# Patient Record
Sex: MALE | Race: WHITE | URBAN - METROPOLITAN AREA
[De-identification: names, ages, dates, MRNs, and addresses within clinical notes are randomized per-mention and may not be internally consistent; named-entity substitution may affect disease eponyms.]

---

## 2017-03-31 RX ORDER — METOPROLOL SUCCINATE 25 MG/1
TABLET, EXTENDED RELEASE ORAL
Qty: 30 TABLET | Refills: 5 | Status: SHIPPED | OUTPATIENT
Start: 2017-03-31 | End: 2017-11-21 | Stop reason: SDUPTHER

## 2017-07-11 RX ORDER — LISINOPRIL 5 MG/1
TABLET ORAL
Qty: 30 TABLET | Refills: 1 | Status: SHIPPED | OUTPATIENT
Start: 2017-07-11 | End: 2017-11-21 | Stop reason: SDUPTHER

## 2017-08-07 RX ORDER — ATORVASTATIN CALCIUM 40 MG/1
TABLET, FILM COATED ORAL
Qty: 30 TABLET | Refills: 0 | Status: SHIPPED | OUTPATIENT
Start: 2017-08-07 | End: 2017-09-06 | Stop reason: SDUPTHER

## 2017-09-06 DIAGNOSIS — E78.00 PURE HYPERCHOLESTEROLEMIA: Primary | ICD-10-CM

## 2017-09-06 DIAGNOSIS — I25.10 CORONARY ARTERY DISEASE DUE TO LIPID RICH PLAQUE: ICD-10-CM

## 2017-09-06 DIAGNOSIS — I25.83 CORONARY ARTERY DISEASE DUE TO LIPID RICH PLAQUE: ICD-10-CM

## 2017-09-06 RX ORDER — ATORVASTATIN CALCIUM 40 MG/1
TABLET, FILM COATED ORAL
Qty: 30 TABLET | Refills: 3 | Status: SHIPPED | OUTPATIENT
Start: 2017-09-06 | End: 2017-11-21 | Stop reason: SDUPTHER

## 2017-10-16 RX ORDER — LISINOPRIL 5 MG/1
TABLET ORAL
Qty: 30 TABLET | Refills: 6 | Status: SHIPPED | OUTPATIENT
Start: 2017-10-16 | End: 2017-11-21 | Stop reason: SDUPTHER

## 2017-11-21 ENCOUNTER — OFFICE VISIT (OUTPATIENT)
Dept: CARDIOLOGY CLINIC | Age: 72
End: 2017-11-21

## 2017-11-21 VITALS
WEIGHT: 240.13 LBS | OXYGEN SATURATION: 98 % | DIASTOLIC BLOOD PRESSURE: 82 MMHG | SYSTOLIC BLOOD PRESSURE: 134 MMHG | HEART RATE: 77 BPM | BODY MASS INDEX: 32.52 KG/M2 | HEIGHT: 72 IN

## 2017-11-21 DIAGNOSIS — I25.83 CORONARY ARTERY DISEASE DUE TO LIPID RICH PLAQUE: Primary | ICD-10-CM

## 2017-11-21 DIAGNOSIS — E78.2 MIXED HYPERLIPIDEMIA: ICD-10-CM

## 2017-11-21 DIAGNOSIS — I10 ESSENTIAL HYPERTENSION: ICD-10-CM

## 2017-11-21 DIAGNOSIS — I25.10 CORONARY ARTERY DISEASE DUE TO LIPID RICH PLAQUE: Primary | ICD-10-CM

## 2017-11-21 PROCEDURE — 99214 OFFICE O/P EST MOD 30 MIN: CPT | Performed by: INTERNAL MEDICINE

## 2017-11-21 PROCEDURE — 1036F TOBACCO NON-USER: CPT | Performed by: INTERNAL MEDICINE

## 2017-11-21 PROCEDURE — 3017F COLORECTAL CA SCREEN DOC REV: CPT | Performed by: INTERNAL MEDICINE

## 2017-11-21 PROCEDURE — 4040F PNEUMOC VAC/ADMIN/RCVD: CPT | Performed by: INTERNAL MEDICINE

## 2017-11-21 PROCEDURE — G8427 DOCREV CUR MEDS BY ELIG CLIN: HCPCS | Performed by: INTERNAL MEDICINE

## 2017-11-21 PROCEDURE — 1123F ACP DISCUSS/DSCN MKR DOCD: CPT | Performed by: INTERNAL MEDICINE

## 2017-11-21 PROCEDURE — G8599 NO ASA/ANTIPLAT THER USE RNG: HCPCS | Performed by: INTERNAL MEDICINE

## 2017-11-21 PROCEDURE — G8484 FLU IMMUNIZE NO ADMIN: HCPCS | Performed by: INTERNAL MEDICINE

## 2017-11-21 PROCEDURE — 93000 ELECTROCARDIOGRAM COMPLETE: CPT | Performed by: INTERNAL MEDICINE

## 2017-11-21 PROCEDURE — G8417 CALC BMI ABV UP PARAM F/U: HCPCS | Performed by: INTERNAL MEDICINE

## 2017-11-21 RX ORDER — LISINOPRIL 5 MG/1
5 TABLET ORAL DAILY
Qty: 90 TABLET | Refills: 3 | Status: SHIPPED | OUTPATIENT
Start: 2017-11-21 | End: 2019-02-14 | Stop reason: SDUPTHER

## 2017-11-21 RX ORDER — ATORVASTATIN CALCIUM 40 MG/1
40 TABLET, FILM COATED ORAL DAILY
Qty: 90 TABLET | Refills: 3 | Status: SHIPPED | OUTPATIENT
Start: 2017-11-21 | End: 2018-12-23 | Stop reason: SDUPTHER

## 2017-11-21 RX ORDER — METOPROLOL SUCCINATE 25 MG/1
25 TABLET, EXTENDED RELEASE ORAL DAILY
Qty: 90 TABLET | Refills: 3 | Status: SHIPPED | OUTPATIENT
Start: 2017-11-21 | End: 2018-12-23 | Stop reason: SDUPTHER

## 2017-11-21 NOTE — PROGRESS NOTES
12/28/14 224 lb (101.6 kg)     Constitutional: He is oriented to person, place, and time. He appears well-developed and well-nourished. In no acute distress. Head: Normocephalic and atraumatic. Pupils equal and round. Neck: Neck supple. No JVP or carotid bruit appreciated. No mass and no thyromegaly present. No lymphadenopathy present. Cardiovascular: Normal rate. Normal heart sounds. Exam reveals no gallop and no friction rub. No murmur heard. Pulmonary/Chest: Effort normal and breath sounds normal. No respiratory distress. He has no wheezes, rhonchi or rales. Abdominal: Soft, non-tender. Bowel sounds are normal. He exhibits no organomegaly, mass or bruit. Extremities: No edema, cyanosis, or clubbing. Pulses are 2+ radial/dorsalis pedis/posterior tibial/carotid bilaterally. Neurological: No gross cranial nerve deficit. Coordination normal.   Skin: Skin is warm and dry. There is no rash or diaphoresis. Psychiatric: He has a normal mood and affect. His speech is normal and behavior is normal.     Lab Review:   Lab Results   Component Value Date    TRIG 187 05/29/2014    HDL 44 05/29/2014    LDLCALC 141 05/29/2014    LABVLDL 37 05/29/2014       Lab Results   Component Value Date    BUN 22 12/28/2014    CREATININE 0.9 12/28/2014     EKG Interpretation: 11/2017, sinus rhythm     Image Review:     Cardiac cath:5/29/14  LEFT VENTRICULOGRAM: Reveals mild anterolateral hypokinesis but overall the ejection fraction is still around 50%. No mitral regurgitation is seen. OVERALL IMPRESSION:  1. Proximal 20% to 30% right coronary artery stenosis. 2. Patent left main trunk. 3. Stenosis 99.9% of the proximal left anterior descending artery with GIACOMO grade 1 flow. 4. Patent left circumflex artery.   5. Successful angioplasty followed by Drug-Eluting stent deployment in the proximal left anterior descending artery 99.9% stenosis reduced to 0%; final diameter is 3.50 in the proximal and 3.28 in the distal segment with GIACOMO grade 3 flow. 6. Bring the patient to ICU, get serial enzymes and get an echocardiogram.  7. Will continue with anti-platelet beta blocker and statin therapy. ECHO:5/29/14  Left ventricle size is normal.  Normal left ventricular wall thickness. Ejection fraction is visually estimated to be approximately 45%. There is severe hypokinesis of the apex. There is reversal of E/A inflow velocities across the mitral valve suggesting impaired left ventricular relaxation. Normal right ventricular size and function. There are no significant valvular abnormalities appreciated in this study. Assessment/Plan:     CAD (coronary artery disease)  Patient is here for a follow-up after a year and a half. Denies having any chest pain, shortness of breath, jaw pain or palpitation. His EKG today shows sinus rhythm with poor R-wave progression with no acute changes. He is currently on beta blocker, statin and aspirin therapy. I have told him to continue his current cardiac medications and have a regular follow-up    HTN (hypertension)  Blood pressure is stable today. I will repeat CMP. Hyperlipidemia  Patient had no recent lipid profile. Pt will have liver/ lipid profile rechecked. He will return in follow up in 6 months -1 year per patient request. Patient refuses to have a flu vaccine. Thank you very much for allowing me to participate in the care of your patient. Please do not hesitate to contact me if you have any questions.     Sincerely,  Em Corcoran MD      [address], [address] [place] [address] [address]  Ph: [phone]  Fax: [phone]

## 2017-11-21 NOTE — PATIENT INSTRUCTIONS
[employer] Fetch, and be sure to contact your doctor if:  · You would like help planning heart-healthy meals. Where can you learn more? Go to [email or web address] and sign in to your [email or web address] account. Enter V137 in the [employer] box to learn more about \"Heart-Healthy Diet: Care Instructions. \"     If you do not have an account, please click on the \"Sign Up Now\" link. Current as of: April 3, 2017  Content Version: 11.3  © 8471-0797 [employer]. Care instructions adapted under license by [facility] ([facility]). If you have questions about a medical condition or this instruction, always ask your healthcare professional. [address] any warranty or liability for your use of this information. Patient Education        High Blood Pressure: Care Instructions  Your Care Instructions  If your blood pressure is usually above 140/90, you have high blood pressure, or hypertension. That means the top number is 140 or higher or the bottom number is 90 or higher, or both. Despite what a lot of people think, high blood pressure usually doesn't cause headaches or make you feel dizzy or lightheaded. It usually has no symptoms. But it does increase your risk for heart attack, stroke, and kidney or eye damage. The higher your blood pressure, the more your risk increases. Your doctor will give you a goal for your blood pressure. Your goal will be based on your health and your age. An example of a goal is to keep your blood pressure below 140/90. Lifestyle changes, such as eating healthy and being active, are always important to help lower blood pressure. You might also take medicine to reach your blood pressure goal.  Follow-up care is a key part of your treatment and safety. Be sure to make and go to all appointments, and call your doctor if you are having problems. It's also a good idea to know your test results and keep a list of the medicines you take.   How can you care for yourself at home? Medical treatment  · If you stop taking your medicine, your blood pressure will go back up. You may take one or more types of medicine to lower your blood pressure. Be safe with medicines. Take your medicine exactly as prescribed. Call your doctor if you think you are having a problem with your medicine. · Talk to your doctor before you start taking aspirin every day. Aspirin can help certain people lower their risk of a heart attack or stroke. But taking aspirin isn't right for everyone, because it can cause serious bleeding. · See your doctor regularly. You may need to see the doctor more often at first or until your blood pressure comes down. · If you are taking blood pressure medicine, talk to your doctor before you take decongestants or anti-inflammatory medicine, such as ibuprofen. Some of these medicines can raise blood pressure. · Learn how to check your blood pressure at home. Lifestyle changes  · Stay at a healthy weight. This is especially important if you put on weight around the waist. Losing even 10 pounds can help you lower your blood pressure. · If your doctor recommends it, get more exercise. Walking is a good choice. Bit by bit, increase the amount you walk every day. Try for at least 30 minutes on most days of the week. You also may want to swim, bike, or do other activities. · Avoid or limit alcohol. Talk to your doctor about whether you can drink any alcohol. · Try to limit how much sodium you eat to less than 2,300 milligrams (mg) a day. Your doctor may ask you to try to eat less than 1,500 mg a day. · Eat plenty of fruits (such as bananas and oranges), vegetables, legumes, whole grains, and low-fat dairy products. · Lower the amount of saturated fat in your diet. Saturated fat is found in animal products such as milk, cheese, and meat. Limiting these foods may help you lose weight and also lower your risk for heart disease. · Do not smoke.  Smoking increases your risk for heart attack and stroke. If you need help quitting, talk to your doctor about stop-smoking programs and medicines. These can increase your chances of quitting for good. When should you call for help? Call 911 anytime you think you may need emergency care. This may mean having symptoms that suggest that your blood pressure is causing a serious heart or blood vessel problem. Your blood pressure may be over 180/110. For example, call 911 if:  · You have symptoms of a heart attack. These may include:  ¨ Chest pain or pressure, or a strange feeling in the chest.  ¨ Sweating. ¨ Shortness of breath. ¨ Nausea or vomiting. ¨ Pain, pressure, or a strange feeling in the back, neck, jaw, or upper belly or in one or both shoulders or arms. ¨ Lightheadedness or sudden weakness. ¨ A fast or irregular heartbeat. · You have symptoms of a stroke. These may include:  ¨ Sudden numbness, tingling, weakness, or loss of movement in your face, arm, or leg, especially on only one side of your body. ¨ Sudden vision changes. ¨ Sudden trouble speaking. ¨ Sudden confusion or trouble understanding simple statements. ¨ Sudden problems with walking or balance. ¨ A sudden, severe headache that is different from past headaches. · You have severe back or belly pain. Do not wait until your blood pressure comes down on its own. Get help right away. Call your doctor now or seek immediate care if:  · Your blood pressure is much higher than normal (such as 180/110 or higher), but you don't have symptoms. · You think high blood pressure is causing symptoms, such as:  ¨ Severe headache. ¨ Blurry vision. Watch closely for changes in your health, and be sure to contact your doctor if:  · Your blood pressure measures 140/90 or higher at least 2 times. That means the top number is 140 or higher or the bottom number is 90 or higher, or both. · You think you may be having side effects from your blood pressure medicine.   · Your blood pressure is usually normal, but it goes above normal at least 2 times. Where can you learn more? Go to [email or web address] and sign in to your [employer] account. Enter F563 in the [employer] box to learn more about \"High Blood Pressure: Care Instructions. \"     If you do not have an account, please click on the \"Sign Up Now\" link. Current as of: August 8, 2016  Content Version: 11.3  © 5574-8344 [employer]. Care instructions adapted under license by [facility] ([facility]). If you have questions about a medical condition or this instruction, always ask your healthcare professional. [address] any warranty or liability for your use of this information.

## 2018-12-24 RX ORDER — METOPROLOL SUCCINATE 25 MG/1
TABLET, EXTENDED RELEASE ORAL
Qty: 90 TABLET | Refills: 0 | Status: SHIPPED | OUTPATIENT
Start: 2018-12-24 | End: 2019-04-12 | Stop reason: SDUPTHER

## 2018-12-24 RX ORDER — ATORVASTATIN CALCIUM 40 MG/1
TABLET, FILM COATED ORAL
Qty: 90 TABLET | Refills: 0 | Status: SHIPPED | OUTPATIENT
Start: 2018-12-24 | End: 2019-04-12 | Stop reason: SDUPTHER

## 2019-02-16 RX ORDER — LISINOPRIL 5 MG/1
TABLET ORAL
Qty: 30 TABLET | Refills: 0 | Status: SHIPPED | OUTPATIENT
Start: 2019-02-16 | End: 2019-07-26 | Stop reason: SDUPTHER

## 2019-04-11 DIAGNOSIS — Z79.899 ON STATIN THERAPY: ICD-10-CM

## 2019-04-11 DIAGNOSIS — E78.2 MIXED HYPERLIPIDEMIA: ICD-10-CM

## 2019-04-11 DIAGNOSIS — I25.10 CORONARY ARTERY DISEASE INVOLVING NATIVE CORONARY ARTERY OF NATIVE HEART WITHOUT ANGINA PECTORIS: Primary | ICD-10-CM

## 2019-04-11 RX ORDER — ATORVASTATIN CALCIUM 40 MG/1
TABLET, FILM COATED ORAL
Qty: 90 TABLET | Refills: 2 | OUTPATIENT
Start: 2019-04-11

## 2019-04-11 NOTE — TELEPHONE ENCOUNTER
Last OV 11/21/17. Requested to return in 6 to 12 months. No show on 11/21/18. Lab work ordered on 11/21/17 and 4/27/17. No longer lab done after ordered. Attempted to call patient at home. Not allowed to talk to anyone per HIPAA. Son confirmed patient's cell phone number [phone]. Called patient and let him know that if he makes an appointment today that I can refill enough to hold him over to his appointment and he can get additional refills after keeping his appointment. I also requested that he have past due lab work. Patient declined both lab work and office visit. He said he will call back and schedule visit. I asked him to let the front office aware so we can refill his medications at that time. Patient verbalized and confirmed understanding. Place order in Epic for fastin Lipid panel and CMP.

## 2019-04-16 RX ORDER — METOPROLOL SUCCINATE 25 MG/1
TABLET, EXTENDED RELEASE ORAL
Qty: 30 TABLET | Refills: 0 | Status: SHIPPED | OUTPATIENT
Start: 2019-04-16 | End: 2019-07-26 | Stop reason: SDUPTHER

## 2019-04-16 RX ORDER — ATORVASTATIN CALCIUM 40 MG/1
TABLET, FILM COATED ORAL
Qty: 30 TABLET | Refills: 0 | Status: SHIPPED | OUTPATIENT
Start: 2019-04-16 | End: 2019-07-26 | Stop reason: SDUPTHER

## 2019-04-23 RX ORDER — METOPROLOL SUCCINATE 25 MG/1
TABLET, EXTENDED RELEASE ORAL
Qty: 90 TABLET | Refills: 0 | Status: SHIPPED | OUTPATIENT
Start: 2019-04-23 | End: 2019-10-02 | Stop reason: SDUPTHER

## 2019-07-26 RX ORDER — METOPROLOL SUCCINATE 25 MG/1
TABLET, EXTENDED RELEASE ORAL
Qty: 30 TABLET | Refills: 1 | Status: SHIPPED | OUTPATIENT
Start: 2019-07-26 | End: 2019-10-02 | Stop reason: SDUPTHER

## 2019-07-26 RX ORDER — LISINOPRIL 5 MG/1
TABLET ORAL
Qty: 30 TABLET | Refills: 1 | Status: SHIPPED | OUTPATIENT
Start: 2019-07-26 | End: 2019-10-02 | Stop reason: SDUPTHER

## 2019-07-26 RX ORDER — ATORVASTATIN CALCIUM 40 MG/1
TABLET, FILM COATED ORAL
Qty: 30 TABLET | Refills: 1 | Status: SHIPPED | OUTPATIENT
Start: 2019-07-26 | End: 2019-10-02 | Stop reason: SDUPTHER

## 2019-07-26 NOTE — TELEPHONE ENCOUNTER
Medication Refill    Last appointment with cardiology? 11/21/2017    Next appointment with Cardiology? 10/02/2019      Medication needing refilled: atorvastatin (LIPITOR)  Doseage of the medication: 40 MG tablet  How are you taking this medication (QD, BID, TID, QID, PRN):TAKE 1 TABLET BY MOUTH ONE TIME A DAY     Patient want a 30 or 90 day supply called in:  30 day     Medication needing refilled: metoprolol succinate (TOPROL XL)   Doseage of the medication: 25 MG extended release tablet   How are you taking this medication (QD, BID, TID, QID  TAKE 1 TABLET BY MOUTH ONE TIME A DAY    PRN):Patient want a 30 or 90 day supply called in:  30 day     Medication needing refilled: lisinopril (PRINIVIL;ZESTRIL)   Doseage of the medication: 5 MG tablet   How are you taking this medication (QD, BID, TID, QID, PRN):  TAKE ONE TABLET BY MOUTH DAILY  Patient want a 30 or 90 day supply called in:  30 days      Which Pharmacy are we sending the medication to:   [id number] - [facility], [address] [facility]                          Which Pharmacy are we sending the medication to

## 2019-10-02 ENCOUNTER — OFFICE VISIT (OUTPATIENT)
Dept: CARDIOLOGY CLINIC | Age: 74
End: 2019-10-02
Payer: MEDICARE

## 2019-10-02 VITALS
HEART RATE: 58 BPM | SYSTOLIC BLOOD PRESSURE: 122 MMHG | HEIGHT: 72 IN | BODY MASS INDEX: 33.18 KG/M2 | WEIGHT: 245 LBS | OXYGEN SATURATION: 98 % | DIASTOLIC BLOOD PRESSURE: 78 MMHG

## 2019-10-02 DIAGNOSIS — I10 ESSENTIAL HYPERTENSION: ICD-10-CM

## 2019-10-02 DIAGNOSIS — E78.2 MIXED HYPERLIPIDEMIA: ICD-10-CM

## 2019-10-02 DIAGNOSIS — I25.10 CAD IN NATIVE ARTERY: Primary | ICD-10-CM

## 2019-10-02 PROCEDURE — G8417 CALC BMI ABV UP PARAM F/U: HCPCS | Performed by: INTERNAL MEDICINE

## 2019-10-02 PROCEDURE — 4040F PNEUMOC VAC/ADMIN/RCVD: CPT | Performed by: INTERNAL MEDICINE

## 2019-10-02 PROCEDURE — 1036F TOBACCO NON-USER: CPT | Performed by: INTERNAL MEDICINE

## 2019-10-02 PROCEDURE — 93000 ELECTROCARDIOGRAM COMPLETE: CPT | Performed by: INTERNAL MEDICINE

## 2019-10-02 PROCEDURE — G8427 DOCREV CUR MEDS BY ELIG CLIN: HCPCS | Performed by: INTERNAL MEDICINE

## 2019-10-02 PROCEDURE — G8484 FLU IMMUNIZE NO ADMIN: HCPCS | Performed by: INTERNAL MEDICINE

## 2019-10-02 PROCEDURE — 99214 OFFICE O/P EST MOD 30 MIN: CPT | Performed by: INTERNAL MEDICINE

## 2019-10-02 PROCEDURE — G8599 NO ASA/ANTIPLAT THER USE RNG: HCPCS | Performed by: INTERNAL MEDICINE

## 2019-10-02 PROCEDURE — 1123F ACP DISCUSS/DSCN MKR DOCD: CPT | Performed by: INTERNAL MEDICINE

## 2019-10-02 PROCEDURE — 3017F COLORECTAL CA SCREEN DOC REV: CPT | Performed by: INTERNAL MEDICINE

## 2019-10-02 RX ORDER — LISINOPRIL 5 MG/1
TABLET ORAL
Qty: 90 TABLET | Refills: 0 | Status: SHIPPED | OUTPATIENT
Start: 2019-10-02 | End: 2020-04-18

## 2019-10-02 RX ORDER — ATORVASTATIN CALCIUM 40 MG/1
TABLET, FILM COATED ORAL
Qty: 90 TABLET | Refills: 0 | Status: SHIPPED | OUTPATIENT
Start: 2019-10-02 | End: 2020-03-03

## 2019-10-02 RX ORDER — METOPROLOL SUCCINATE 25 MG/1
TABLET, EXTENDED RELEASE ORAL
Qty: 90 TABLET | Refills: 0 | Status: SHIPPED | OUTPATIENT
Start: 2019-10-02 | End: 2020-03-03

## 2019-12-04 RX ORDER — LISINOPRIL 5 MG/1
TABLET ORAL
Qty: 30 TABLET | Refills: 5 | Status: SHIPPED | OUTPATIENT
Start: 2019-12-04 | End: 2021-08-11 | Stop reason: SDUPTHER

## 2020-03-03 RX ORDER — ATORVASTATIN CALCIUM 40 MG/1
TABLET, FILM COATED ORAL
Qty: 90 TABLET | Refills: 0 | Status: SHIPPED | OUTPATIENT
Start: 2020-03-03 | End: 2020-06-01

## 2020-03-03 RX ORDER — METOPROLOL SUCCINATE 25 MG/1
TABLET, EXTENDED RELEASE ORAL
Qty: 90 TABLET | Refills: 3 | Status: SHIPPED | OUTPATIENT
Start: 2020-03-03 | End: 2021-05-17 | Stop reason: SDUPTHER

## 2020-04-18 RX ORDER — LISINOPRIL 5 MG/1
TABLET ORAL
Qty: 90 TABLET | Refills: 0 | Status: SHIPPED | OUTPATIENT
Start: 2020-04-18 | End: 2020-10-23

## 2020-06-01 RX ORDER — ATORVASTATIN CALCIUM 40 MG/1
TABLET, FILM COATED ORAL
Qty: 30 TABLET | Refills: 0 | Status: SHIPPED | OUTPATIENT
Start: 2020-06-01 | End: 2020-06-03

## 2020-06-01 NOTE — TELEPHONE ENCOUNTER
Last OV: 10/2/19  Next OV: 10/7/2020  Labs: x  EKG:    Patient needs liver panel. LMOM to return call. Will send in 30 day supply and place order.

## 2020-06-03 RX ORDER — ATORVASTATIN CALCIUM 40 MG/1
TABLET, FILM COATED ORAL
Qty: 30 TABLET | Refills: 0 | Status: SHIPPED | OUTPATIENT
Start: 2020-06-03 | End: 2020-11-12

## 2020-10-23 RX ORDER — LISINOPRIL 5 MG/1
TABLET ORAL
Qty: 90 TABLET | Refills: 0 | Status: SHIPPED | OUTPATIENT
Start: 2020-10-23 | End: 2021-02-26

## 2020-11-10 NOTE — TELEPHONE ENCOUNTER
Last OV 10/2/19. Next OV not scheduled. Requested to return in one year. Can not find Lipid panel in Epic. Attempted to call patient. Left message on voicemail to have fasting lab work as previously ordered by Dr. Fallon Deleon and also call our office to schedule an appointment.

## 2020-11-11 NOTE — TELEPHONE ENCOUNTER
Attempted to call patient again today. Left another message to please return call to office regarding patient having fasting lab work and scheduling an appointment.

## 2020-11-12 RX ORDER — ATORVASTATIN CALCIUM 40 MG/1
TABLET, FILM COATED ORAL
Qty: 60 TABLET | Refills: 0 | Status: SHIPPED | OUTPATIENT
Start: 2020-11-12 | End: 2021-05-17 | Stop reason: SDUPTHER

## 2020-11-12 NOTE — TELEPHONE ENCOUNTER
will call pt about scheduling appt and completing labs. Reminder note will print on the bottle as well as note to pharmacist to  pt.      LAST OV:  10/2/2020  NEXT OV:  none  LAST no labs since 2014

## 2021-02-26 RX ORDER — LISINOPRIL 5 MG/1
TABLET ORAL
Qty: 30 TABLET | Refills: 0 | Status: SHIPPED | OUTPATIENT
Start: 2021-02-26 | End: 2021-05-28

## 2021-05-11 RX ORDER — ATORVASTATIN CALCIUM 40 MG/1
TABLET, FILM COATED ORAL
Qty: 60 TABLET | Refills: 0 | OUTPATIENT
Start: 2021-05-11

## 2021-05-11 RX ORDER — LISINOPRIL 5 MG/1
TABLET ORAL
Qty: 30 TABLET | Refills: 0 | OUTPATIENT
Start: 2021-05-11

## 2021-05-11 NOTE — TELEPHONE ENCOUNTER
Last OV: 10/2/19  Next OV: x  Labs:  Last EKG (if needed):     Made On:  Canceled: 10/2/2019 1:46 PM  10/7/2020 2:03 PM By:  By: BRITTNEY Fitzpatrick   Cancel Rsn: Patient no show (L/V FOR PT. TO CALL TO RESCHEDULE.)

## 2021-05-17 RX ORDER — METOPROLOL SUCCINATE 25 MG/1
25 TABLET, EXTENDED RELEASE ORAL DAILY
Qty: 90 TABLET | Refills: 3 | Status: SHIPPED | OUTPATIENT
Start: 2021-05-17 | End: 2022-05-31

## 2021-05-17 RX ORDER — METOPROLOL SUCCINATE 25 MG/1
TABLET, EXTENDED RELEASE ORAL
Qty: 90 TABLET | Refills: 0 | OUTPATIENT
Start: 2021-05-17

## 2021-05-17 RX ORDER — ATORVASTATIN CALCIUM 40 MG/1
TABLET, FILM COATED ORAL
Qty: 60 TABLET | Refills: 0 | OUTPATIENT
Start: 2021-05-17

## 2021-05-17 RX ORDER — ATORVASTATIN CALCIUM 40 MG/1
TABLET, FILM COATED ORAL
Qty: 60 TABLET | Refills: 0 | Status: SHIPPED | OUTPATIENT
Start: 2021-05-17 | End: 2021-08-16

## 2021-05-28 RX ORDER — LISINOPRIL 5 MG/1
TABLET ORAL
Qty: 30 TABLET | Refills: 0 | Status: SHIPPED | OUTPATIENT
Start: 2021-05-28 | End: 2021-07-23

## 2021-07-23 RX ORDER — LISINOPRIL 5 MG/1
TABLET ORAL
Qty: 30 TABLET | Refills: 0 | Status: SHIPPED | OUTPATIENT
Start: 2021-07-23 | End: 2021-09-10

## 2021-08-11 ENCOUNTER — OFFICE VISIT (OUTPATIENT)
Dept: CARDIOLOGY CLINIC | Age: 76
End: 2021-08-11
Payer: MEDICARE

## 2021-08-11 VITALS
OXYGEN SATURATION: 95 % | WEIGHT: 255 LBS | HEIGHT: 72 IN | HEART RATE: 66 BPM | BODY MASS INDEX: 34.54 KG/M2 | SYSTOLIC BLOOD PRESSURE: 136 MMHG | DIASTOLIC BLOOD PRESSURE: 84 MMHG

## 2021-08-11 DIAGNOSIS — I25.10 CORONARY ARTERY DISEASE INVOLVING NATIVE CORONARY ARTERY OF NATIVE HEART WITHOUT ANGINA PECTORIS: Primary | ICD-10-CM

## 2021-08-11 DIAGNOSIS — R63.5 WEIGHT GAIN: ICD-10-CM

## 2021-08-11 DIAGNOSIS — I25.9 ISCHEMIC HEART DISEASE: ICD-10-CM

## 2021-08-11 DIAGNOSIS — R60.0 LEG EDEMA: ICD-10-CM

## 2021-08-11 DIAGNOSIS — I50.23 ACUTE ON CHRONIC SYSTOLIC HEART FAILURE (HCC): ICD-10-CM

## 2021-08-11 DIAGNOSIS — I10 ESSENTIAL HYPERTENSION: ICD-10-CM

## 2021-08-11 DIAGNOSIS — I51.89 DIASTOLIC DYSFUNCTION: ICD-10-CM

## 2021-08-11 DIAGNOSIS — E78.5 HYPERLIPIDEMIA, UNSPECIFIED HYPERLIPIDEMIA TYPE: ICD-10-CM

## 2021-08-11 PROCEDURE — 93000 ELECTROCARDIOGRAM COMPLETE: CPT | Performed by: INTERNAL MEDICINE

## 2021-08-11 PROCEDURE — 99214 OFFICE O/P EST MOD 30 MIN: CPT | Performed by: INTERNAL MEDICINE

## 2021-08-11 PROCEDURE — 1036F TOBACCO NON-USER: CPT | Performed by: INTERNAL MEDICINE

## 2021-08-11 PROCEDURE — G8417 CALC BMI ABV UP PARAM F/U: HCPCS | Performed by: INTERNAL MEDICINE

## 2021-08-11 PROCEDURE — 4040F PNEUMOC VAC/ADMIN/RCVD: CPT | Performed by: INTERNAL MEDICINE

## 2021-08-11 PROCEDURE — G8427 DOCREV CUR MEDS BY ELIG CLIN: HCPCS | Performed by: INTERNAL MEDICINE

## 2021-08-11 PROCEDURE — 1123F ACP DISCUSS/DSCN MKR DOCD: CPT | Performed by: INTERNAL MEDICINE

## 2021-08-11 RX ORDER — FUROSEMIDE 40 MG/1
40 TABLET ORAL DAILY
Qty: 30 TABLET | Refills: 6 | Status: SHIPPED | OUTPATIENT
Start: 2021-08-11 | End: 2022-10-11

## 2021-08-11 NOTE — PATIENT INSTRUCTIONS
Patient Education        Transthoracic Echocardiogram: About This Test  What is it? An echocardiogram (also called an echo) uses sound waves to make an image of your heart. A device called a transducer sends sound waves that echo off your heart and back to the transducer. These echoes are turned into moving pictures of your heart that can be seen on a video screen. In a transthoracic echocardiogram (TTE), the transducer is moved across your chest or belly. A TTE is the most common type of echocardiogram.  Why is this test done? This test is done to check your heart health. It's used for many reasons. For example, it may be done to:  · Check a heart murmur. · Look for the cause of shortness of breath or unexplained chest pain or pressure. · Check how well your heart is pumping blood. · Check to see how well your heart valves are working. · Look for blood clots inside your heart. · Measure the size and shape of the heart's chambers. · Measure the blood pressure and speed of blood flow through the heart. How is the test done? · You may be asked to remove your clothes above your waist. You may be given a cloth or paper covering to use during the test.  · You will lie on your back or on your left side on a bed or table. · You may receive medicine through a vein (intravenously, or IV). The IV can be used to give you a contrast material. This helps your doctor get good views of your heart. · Small pads or patches (electrodes) will be placed on the skin of your chest to record your heart rate during the test.  · A small amount of gel will be rubbed on the side of your chest to help  the sound waves. · The transducer will be pressed firmly against your chest and moved slowly back and forth. It is usually moved to different areas on your chest or belly to get specific views of your heart.   · You will be asked to do several things, such as hold very still, breathe in and out very slowly, hold your breath, or lie on your left side. · When the test is over, the gel is wiped off and the electrodes are removed. What are the risks of the test?  There are no known risks from having this test.  · No electricity passes through your body during the test. There is no danger of getting an electrical shock. · You do not receive any radiation. What happens after the test?  · You will probably be able to go home right away. It depends on the reason for the test.  · You can go back to your usual activities right away. Follow-up care is a key part of your treatment and safety. Be sure to make and go to all appointments, and call your doctor if you are having problems. It's also a good idea to keep a list of the medicines you take. Ask your doctor when you can expect to have your test results. Current as of: August 31, 2020               Content Version: 12.9  © 2006-2021 Healthwise, [employer]. Care instructions adapted under license by [facility] ([facility]). If you have questions about a medical condition or this instruction, always ask your healthcare professional. [place] [address] any warranty or liability for your use of this information. Patient Education        Low Sodium Diet (2,000 Milligram): Care Instructions  Overview     Limiting sodium can be an important part of managing some health problems. The most common source of sodium is salt. People get most of the salt in their diet from canned, prepared, and packaged foods. Fast food and restaurant meals also are very high in sodium. Your doctor will probably limit your sodium to less than 2,000 milligrams (mg) a day. This limit counts all the sodium in prepared and packaged foods and any salt you add to your food. Follow-up care is a key part of your treatment and safety. Be sure to make and go to all appointments, and call your doctor if you are having problems.  It's also a good idea to know your test results and keep a list of the medicines you take. How can you care for yourself at home? Read food labels  · Read labels on cans and food packages. The labels tell you how much sodium is in each serving. Make sure that you look at the serving size. If you eat more than the serving size, you have eaten more sodium. · Food labels also tell you the Percent Daily Value for sodium. Choose products with low Percent Daily Values for sodium. · Be aware that sodium can come in forms other than salt, including monosodium glutamate (MSG), sodium citrate, and sodium bicarbonate (baking soda). MSG is often added to Asian food. When you eat out, you can sometimes ask for food without MSG or added salt. Buy low-sodium foods  · Buy foods that are labeled \"unsalted\" (no salt added), \"sodium-free\" (less than 5 mg of sodium per serving), or \"low-sodium\" (140 mg or less of sodium per serving). Foods labeled \"reduced-sodium\" and \"light sodium\" may still have too much sodium. Be sure to read the label to see how much sodium you are getting. · Buy fresh vegetables, or frozen vegetables without added sauces. Buy low-sodium versions of canned vegetables, soups, and other canned goods. Prepare low-sodium meals  · Cut back on the amount of salt you use in cooking. This will help you adjust to the taste. Do not add salt after cooking. One teaspoon of salt has about 2,300 mg of sodium. · Take the salt shaker off the table. · Flavor your food with garlic, lemon juice, onion, vinegar, herbs, and spices. Do not use soy sauce, lite soy sauce, steak sauce, onion salt, garlic salt, celery salt, or ketchup on your food. · Use low-sodium salad dressings, sauces, and ketchup. Or make your own salad dressings and sauces without adding salt. · Use less salt (or none) when recipes call for it. You can often use half the salt a recipe calls for without losing flavor. Other foods such as rice, pasta, and grains do not need added salt.   · Rinse canned vegetables, and cook them in fresh water. This removes some--but not all--of the salt. · Avoid water that is naturally high in sodium or that has been treated with water softeners, which add sodium. If you buy bottled water, read the label and choose a sodium-free brand. Avoid high-sodium foods  · Avoid eating:  ? Smoked, cured, salted, and canned meat, fish, and poultry. ? Ham, anderson, hot dogs, and luncheon meats. ? Regular, hard, and processed cheese and regular peanut butter. ? Crackers with salted tops, and other salted snack foods such as pretzels, chips, and salted popcorn. ? Frozen prepared meals, unless labeled low-sodium. ? Canned and dried soups, broths, and bouillon, unless labeled sodium-free or low-sodium. ? Canned vegetables, unless labeled sodium-free or low-sodium. ? [place] fries, pizza, tacos, and other fast foods. ? Pickles, olives, ketchup, and other condiments, especially soy sauce, unless labeled sodium-free or low-sodium. Where can you learn more? Go to https://ThreatStream.Stretch. org and sign in to your Mobile Authentication account. Enter C462 in the [facility] box to learn more about \"Low Sodium Diet (2,000 Milligram): Care Instructions. \"     If you do not have an account, please click on the \"Sign Up Now\" link. Current as of: December 17, 2020               Content Version: 12.9  © 2006-2021 [employer]. Care instructions adapted under license by [facility] ([facility]). If you have questions about a medical condition or this instruction, always ask your healthcare professional. [place] [address] any warranty or liability for your use of this information. Patient Education        Avoiding Triggers With Heart Failure: Care Instructions  Your Care Instructions     Triggers are anything that make your heart failure flare up. A flare-up is also called \"sudden heart failure\" or \"acute heart failure. \" When you have a flare-up, fluid builds up in your lungs, and you have food without MSG or salt. · Slowly reducing salt will help you adjust to the taste. Take the salt shaker off the table. · Flavor your food with garlic, lemon juice, onion, vinegar, herbs, and spices instead of salt. Do not use soy sauce, steak sauce, onion salt, garlic salt, mustard, or ketchup on your food, unless it is labeled \"low-sodium\" or \"low-salt. \"  · Make your own salad dressings, sauces, and ketchup without adding salt. · Use fresh or frozen ingredients, instead of canned ones, whenever you can. Choose low-sodium canned goods. · Eat less processed food and food from restaurants, including fast food. Exercise as directed  Moderate, regular exercise is very good for your heart. It improves your blood flow and helps control your weight. But too much exercise can stress your heart and cause a heart failure flare-up. · Check with your doctor before you start an exercise program.  · Walking is an easy way to get exercise. Start out slowly. Gradually increase the length and pace of your walk. Swimming, riding a bike, and using a treadmill are also good forms of exercise. · When you exercise, watch for signs that your heart is working too hard. You are pushing yourself too hard if you cannot talk while you are exercising. If you become short of breath or dizzy or have chest pain, stop, sit down, and rest.  · Do not exercise when you do not feel well. Take medicines correctly  · Take your medicines exactly as prescribed. Call your doctor if you think you are having a problem with your medicine. · Make a list of all the medicines you take. Include those prescribed to you by other doctors and any over-the-counter medicines, vitamins, or supplements you take. Take this list with you when you go to any doctor. · Take your medicines at the same time every day. It may help you to post a list of all the medicines you take every day and what time of day you take them.   · Make taking your medicine as simple as you can. Plan times to take your medicines when you are doing other things, such as eating a meal or getting ready for bed. This will make it easier to remember to take your medicines. · Get organized. Use helpful tools, such as daily or weekly pill containers. When should you call for help? Call 911  if you have symptoms of sudden heart failure such as:    · You have severe trouble breathing.     · You cough up pink, foamy mucus.     · You have a new irregular or rapid heartbeat. Call your doctor now or seek immediate medical care if:    · You have new or increased shortness of breath.     · You are dizzy or lightheaded, or you feel like you may faint.     · You have sudden weight gain, such as more than 2 to 3 pounds in a day or 5 pounds in a week. (Your doctor may suggest a different range of weight gain.)     · You have increased swelling in your legs, ankles, or feet.     · You are suddenly so tired or weak that you cannot do your usual activities. Watch closely for changes in your health, and be sure to contact your doctor if you develop new symptoms. Where can you learn more? Go to https://Earl Energy.Lighting Science [email or web address] org and sign in to your Porous Power account. Enter S972 in the [employer] box to learn more about \"Avoiding Triggers With Heart Failure: Care Instructions. \"     If you do not have an account, please click on the \"Sign Up Now\" link. Current as of: August 31, 2020               Content Version: 12.9  © 2006-2021 [employer]. Care instructions adapted under license by [facility] ([facility]). If you have questions about a medical condition or this instruction, always ask your healthcare professional. [place] [address] any warranty or liability for your use of this information. Patient Education        Leg and Ankle Edema: Care Instructions  Your Care Instructions  Swelling in the legs, ankles, and feet is called edema.  It is common after you sit or stand for a while. Long plane flights or car rides often cause swelling in the legs and feet. You may also have swelling if you have to stand for long periods of time at your job. Problems with the veins in the legs (varicose veins) and changes in hormones can also cause swelling. Sometimes the swelling in the ankles and feet is caused by a more serious problem, such as heart failure, infection, blood clots, or liver or kidney disease. Follow-up care is a key part of your treatment and safety. Be sure to make and go to all appointments, and call your doctor if you are having problems. It's also a good idea to know your test results and keep a list of the medicines you take. How can you care for yourself at home? · If your doctor gave you medicine, take it as prescribed. Call your doctor if you think you are having a problem with your medicine. · Whenever you are resting, raise your legs up. Try to keep the swollen area higher than the level of your heart. · Take breaks from standing or sitting in one position. ? Walk around to increase the blood flow in your lower legs. ? Move your feet and ankles often while you stand, or tighten and relax your leg muscles. · Wear support stockings. Put them on in the morning, before swelling gets worse. · Eat a balanced diet. Lose weight if you need to. · Limit the amount of salt (sodium) in your diet. Salt holds fluid in the body and may increase swelling. When should you call for help? Call 911 anytime you think you may need emergency care. For example, call if:    · You have symptoms of a blood clot in your lung (called a pulmonary embolism). These may include:  ? Sudden chest pain. ? Trouble breathing. ? Coughing up blood. Call your doctor now or seek immediate medical care if:    · You have signs of a blood clot, such as:  ? Pain in your calf, back of the knee, thigh, or groin. ?  Redness and swelling in your leg or groin.     · You have symptoms of infection, such as:  ? Increased pain, swelling, warmth, or redness. ? Red streaks or pus. ? A fever. Watch closely for changes in your health, and be sure to contact your doctor if:    · Your swelling is getting worse.     · You have new or worsening pain in your legs.     · You do not get better as expected. Where can you learn more? Go to https://drumbipepiceweb.[email or web address] org and sign in to your Nextt account. Enter Q575 in the [employer] box to learn more about \"Leg and Ankle Edema: Care Instructions. \"     If you do not have an account, please click on the \"Sign Up Now\" link. Current as of: October 19, 2020               Content Version: 12.9  © 2006-2021 Healthwise, Incorporated. Care instructions adapted under license by [facility] [address] If you have questions about a medical condition or this instruction, always ask your healthcare professional. [place] [address] any warranty or liability for your use of this information.

## 2021-08-11 NOTE — PROGRESS NOTES
81 MG chewable tablet Take 1 tablet by mouth daily. 30 tablet 3     No current facility-administered medications for this visit. Review of Systems:Review of Systems:  · Constitutional: no unanticipated weight loss. There's been no change in energy level, sleep pattern, or activity level. No fevers, chills. · Eyes: No visual changes or diplopia. No scleral icterus. · ENT: No Headaches, hearing loss or vertigo. No mouth sores or sore throat. · Cardiovascular: as reviewed in HPI  · Respiratory: No cough or wheezing, no sputum production. No hematemesis. · Gastrointestinal: No abdominal pain, appetite loss, blood in stools. No change in bowel or bladder habits. · Genitourinary: No dysuria, trouble voiding, or hematuria. · Musculoskeletal:  No gait disturbance, no joint complaints. · Integumentary: No rash or pruritis. · Neurological: No headache, diplopia, change in muscle strength, numbness or tingling. · Psychiatric: No anxiety or depression. · Endocrine: No temperature intolerance. No excessive thirst, fluid intake, or urination. No tremor. · Hematologic/Lymphatic: No abnormal bruising or bleeding, blood clots or swollen lymph nodes. · Allergic/Immunologic: No nasal congestion or hives. Physical Exam:   /84   Pulse 66   Ht 6' (1.829 m)   Wt 255 lb (115.7 kg)   SpO2 95%   BMI 34.58 kg/m²   Wt Readings from Last 3 Encounters:   08/11/21 255 lb (115.7 kg)   10/02/19 245 lb (111.1 kg)   11/21/17 240 lb 2 oz (108.9 kg)     Constitutional: He is oriented to person, place, and time. He appears well-developed and well-nourished. In no acute distress. Head: Normocephalic and atraumatic. Pupils equal and round. Neck: Neck supple. No JVP or carotid bruit appreciated. No mass and no thyromegaly present. No lymphadenopathy present. Cardiovascular: Normal rate. Normal heart sounds. Exam reveals no gallop and no friction rub. No murmur heard.   Pulmonary/Chest: Effort normal and breath sounds normal. No respiratory distress. He has no wheezes, rhonchi or rales. Abdominal: Soft, non-tender. Bowel sounds are normal. He exhibits no organomegaly, mass or bruit. Extremities: 2+ BLE edema, no cyanosis, or clubbing. Pulses are 2+ radial/dorsalis pedis/posterior tibial/carotid bilaterally. Neurological: No gross cranial nerve deficit. Coordination normal.   Skin: Skin is warm and dry. There is no rash or diaphoresis. Psychiatric: He has a normal mood and affect. His speech is normal and behavior is normal.     Lab Review:   Lab Results   Component Value Date    TRIG 187 05/29/2014    HDL 44 05/29/2014    LDLCALC 141 05/29/2014    LABVLDL 37 05/29/2014       Lab Results   Component Value Date    BUN 22 12/28/2014    CREATININE 0.9 12/28/2014     EKG Interpretation:   11/2017, sinus rhythm   8/11/21: Sinus  Rhythm -Poor R-wave progression -may be secondary to pulmonary disease   consider old anterior infarct. Low voltage -possible pulmonary disease. ~65 bpm.       Image Review:     Cardiac cath:5/29/14  LEFT VENTRICULOGRAM: Reveals mild anterolateral hypokinesis but overall the ejection fraction is still around 50%. No mitral regurgitation is seen. OVERALL IMPRESSION:  1. Proximal 20% to 30% right coronary artery stenosis. 2. Patent left main trunk. 3. Stenosis 99.9% of the proximal left anterior descending artery with GIACOMO grade 1 flow. 4. Patent left circumflex artery. 5. Successful angioplasty followed by Drug-Eluting stent deployment in the proximal left anterior descending artery 99.9% stenosis reduced to 0%; final diameter is 3.50 in the proximal and 3.28 in the distal segment with GIACOMO grade 3 flow. 6. Bring the patient to ICU, get serial enzymes and get an echocardiogram.  7. Will continue with anti-platelet beta blocker and statin therapy. ECHO:5/29/14  Left ventricle size is normal.  Normal left ventricular wall thickness.   Ejection fraction is visually estimated to be approximately 45%. There is severe hypokinesis of the apex. There is reversal of E/A inflow velocities across the mitral valve suggesting impaired left ventricular relaxation. Normal right ventricular size and function. There are no significant valvular abnormalities appreciated in this study. Assessment/Plan:     Ischemic cardiomyopathy/acute on chronic systolic heart failure/diastolic dysfunction  BLE edema, 2+ per physical exam otherwise normal. He is up 10# since our last visit almost 2 years ago at his last office visit. He denies any SOB, CLAY, PND, orthopnea. 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 His clinical examination is remarkable for at least 2-3+ lower extremity edema. His physical examination is concerning for heart failure decompensation. EKG today sinus  Rhythm -Poor R-wave progression -may be secondary to pulmonary disease   consider old anterior infarct. Low voltage -possible pulmonary disease. ~65 bpm. I would like to complete an echocardiogram to assess for progressive structural heart disease. I would like to start him on Lasix 40 mg daily. BMP, BNP in 2 weeks. He has not obtained COVID vaccine to date. Coronary artery disease  Patient denies any symptoms of chest pain and has remained stable. Pt is on BBlocker, Statin and Antiplatelet therapy    Hypertension, essential   Blood pressure is stable today. Hyperlipidemia, unspecified   No recent lipid / liver panel on file. He is on high potency statin therapy. He will return in follow up in 4-6 weeks after echo completed. Instructed to obtain flu vaccine this season, annually and obtain COVID-19 vaccine once available. Complexity of medical decision making-high      Thank you very much for allowing me to participate in the care of your patient. Please do not hesitate to contact me if you have any questions.   Sincerely,  Mari Perry MD      [facility] [place]  [facility], [address] [facility]  Ph: [phone]  Fax: [phone]    This note was scribed in the presence of Dr. Linda Ansari MD by Anika Higuera RN.

## 2021-08-16 RX ORDER — ATORVASTATIN CALCIUM 40 MG/1
TABLET, FILM COATED ORAL
Qty: 60 TABLET | Refills: 0 | Status: SHIPPED | OUTPATIENT
Start: 2021-08-16 | End: 2021-11-29

## 2021-08-16 NOTE — TELEPHONE ENCOUNTER
Last OV: 08/11/2021  Next OV: 09/21/2021  Last refill:  Most recent Labs:   x  Last PT/INR (if needed):  Last EKG (if needed):08/11/2021

## 2021-09-10 RX ORDER — LISINOPRIL 5 MG/1
TABLET ORAL
Qty: 30 TABLET | Refills: 0 | Status: SHIPPED | OUTPATIENT
Start: 2021-09-10 | End: 2022-10-11 | Stop reason: SDUPTHER

## 2021-09-10 RX ORDER — LISINOPRIL 5 MG/1
TABLET ORAL
Qty: 30 TABLET | Refills: 0 | Status: SHIPPED | OUTPATIENT
Start: 2021-09-10 | End: 2021-10-26 | Stop reason: SDUPTHER

## 2021-09-10 NOTE — TELEPHONE ENCOUNTER
Last OV: 8/11/21  Next OV: 9/21/21  Last refill:7/23/21  Most recent Labs: 8/11/21  Last EKG (if needed):8/11/21

## 2021-09-10 NOTE — TELEPHONE ENCOUNTER
Last OV: 8/11/21  Next OV: 9/21/21  Last refill:7/23/21  Most recent Labs: 8/11/21  Last EKG (if needed):

## 2021-10-26 RX ORDER — LISINOPRIL 5 MG/1
TABLET ORAL
Qty: 90 TABLET | Refills: 2 | Status: SHIPPED | OUTPATIENT
Start: 2021-10-26 | End: 2022-10-11 | Stop reason: SDUPTHER

## 2021-10-26 NOTE — TELEPHONE ENCOUNTER
Last OV: 08/11/2021  Next OV: x  Last refill:09/09/2021  Most recent Labs: x  Last EKG (if needed):08/11/2021

## 2021-11-29 RX ORDER — ATORVASTATIN CALCIUM 40 MG/1
TABLET, FILM COATED ORAL
Qty: 60 TABLET | Refills: 0 | Status: SHIPPED | OUTPATIENT
Start: 2021-11-29 | End: 2022-05-23

## 2022-05-23 RX ORDER — ATORVASTATIN CALCIUM 40 MG/1
TABLET, FILM COATED ORAL
Qty: 60 TABLET | Refills: 0 | Status: SHIPPED | OUTPATIENT
Start: 2022-05-23 | End: 2022-08-12 | Stop reason: SDUPTHER

## 2022-05-23 NOTE — TELEPHONE ENCOUNTER
Last OV: 8/11/21  Next OV:   Last refill:11/29/21  Most recent Labs:  Need lab work, multiple orders in chart   Last EKG (if needed):8/11/21    Need apt, left vm to call office back to schedeule apt

## 2022-05-24 RX ORDER — ATORVASTATIN CALCIUM 40 MG/1
TABLET, FILM COATED ORAL
Qty: 60 TABLET | Refills: 0 | OUTPATIENT
Start: 2022-05-24

## 2022-05-24 RX ORDER — METOPROLOL SUCCINATE 25 MG/1
TABLET, EXTENDED RELEASE ORAL
Qty: 90 TABLET | Refills: 0 | OUTPATIENT
Start: 2022-05-24

## 2022-05-31 RX ORDER — METOPROLOL SUCCINATE 25 MG/1
TABLET, EXTENDED RELEASE ORAL
Qty: 30 TABLET | Refills: 0 | Status: SHIPPED | OUTPATIENT
Start: 2022-05-31 | End: 2022-08-12 | Stop reason: SDUPTHER

## 2022-05-31 NOTE — TELEPHONE ENCOUNTER
Last ov 8/11/21  Pending appt overdue for appt  Last refill 5/17/21 #90x3  Last labs 12/28/14      [phone] (home)   Left message on pts machine to call back  Pt is overdue for appt and labs

## 2022-08-12 RX ORDER — METOPROLOL SUCCINATE 25 MG/1
TABLET, EXTENDED RELEASE ORAL
Qty: 60 TABLET | Refills: 0 | Status: SHIPPED | OUTPATIENT
Start: 2022-08-12 | End: 2022-08-18

## 2022-08-12 RX ORDER — ATORVASTATIN CALCIUM 40 MG/1
TABLET, FILM COATED ORAL
Qty: 60 TABLET | Refills: 0 | Status: SHIPPED | OUTPATIENT
Start: 2022-08-12 | End: 2022-08-18

## 2022-08-12 NOTE — TELEPHONE ENCOUNTER
Received refill request for Metoprolol & Atorvastatin     Last OV: 08/11/2021 w/ AND     Last Labs: none recently     Last Refill: 05/31/2022 #30 w/ 0 refills     Next Appointment: 10/11/2022 w/ AND

## 2022-08-18 DIAGNOSIS — I50.23 ACUTE ON CHRONIC SYSTOLIC HEART FAILURE (HCC): ICD-10-CM

## 2022-08-18 DIAGNOSIS — E78.5 HYPERLIPIDEMIA, UNSPECIFIED HYPERLIPIDEMIA TYPE: Primary | ICD-10-CM

## 2022-08-18 DIAGNOSIS — I10 ESSENTIAL HYPERTENSION: ICD-10-CM

## 2022-08-18 RX ORDER — ATORVASTATIN CALCIUM 40 MG/1
TABLET, FILM COATED ORAL
Qty: 60 TABLET | Refills: 0 | Status: SHIPPED | OUTPATIENT
Start: 2022-08-18 | End: 2022-08-29

## 2022-08-18 RX ORDER — METOPROLOL SUCCINATE 25 MG/1
TABLET, EXTENDED RELEASE ORAL
Qty: 30 TABLET | Refills: 0 | Status: SHIPPED | OUTPATIENT
Start: 2022-08-18 | End: 2022-08-29

## 2022-08-29 RX ORDER — ATORVASTATIN CALCIUM 40 MG/1
TABLET, FILM COATED ORAL
Qty: 30 TABLET | Refills: 0 | Status: SHIPPED | OUTPATIENT
Start: 2022-08-29 | End: 2022-10-11 | Stop reason: SDUPTHER

## 2022-08-29 RX ORDER — METOPROLOL SUCCINATE 25 MG/1
TABLET, EXTENDED RELEASE ORAL
Qty: 30 TABLET | Refills: 0 | Status: SHIPPED | OUTPATIENT
Start: 2022-08-29 | End: 2022-10-11 | Stop reason: SDUPTHER

## 2022-08-29 RX ORDER — ATORVASTATIN CALCIUM 40 MG/1
TABLET, FILM COATED ORAL
Qty: 60 TABLET | Refills: 0 | OUTPATIENT
Start: 2022-08-29

## 2022-10-11 ENCOUNTER — OFFICE VISIT (OUTPATIENT)
Dept: CARDIOLOGY CLINIC | Age: 77
End: 2022-10-11
Payer: MEDICARE

## 2022-10-11 VITALS
DIASTOLIC BLOOD PRESSURE: 74 MMHG | BODY MASS INDEX: 32.51 KG/M2 | WEIGHT: 240 LBS | HEIGHT: 72 IN | HEART RATE: 82 BPM | OXYGEN SATURATION: 98 % | SYSTOLIC BLOOD PRESSURE: 130 MMHG

## 2022-10-11 DIAGNOSIS — E78.5 HYPERLIPIDEMIA, UNSPECIFIED HYPERLIPIDEMIA TYPE: ICD-10-CM

## 2022-10-11 DIAGNOSIS — I50.22 CHRONIC SYSTOLIC HEART FAILURE (HCC): Primary | ICD-10-CM

## 2022-10-11 DIAGNOSIS — I51.89 DIASTOLIC DYSFUNCTION: ICD-10-CM

## 2022-10-11 DIAGNOSIS — I25.5 ISCHEMIC CARDIOMYOPATHY: ICD-10-CM

## 2022-10-11 DIAGNOSIS — I25.10 CORONARY ARTERY DISEASE INVOLVING NATIVE CORONARY ARTERY OF NATIVE HEART WITHOUT ANGINA PECTORIS: ICD-10-CM

## 2022-10-11 DIAGNOSIS — I10 ESSENTIAL HYPERTENSION: ICD-10-CM

## 2022-10-11 PROCEDURE — 1123F ACP DISCUSS/DSCN MKR DOCD: CPT | Performed by: INTERNAL MEDICINE

## 2022-10-11 PROCEDURE — 93000 ELECTROCARDIOGRAM COMPLETE: CPT | Performed by: INTERNAL MEDICINE

## 2022-10-11 PROCEDURE — G8417 CALC BMI ABV UP PARAM F/U: HCPCS | Performed by: INTERNAL MEDICINE

## 2022-10-11 PROCEDURE — 1036F TOBACCO NON-USER: CPT | Performed by: INTERNAL MEDICINE

## 2022-10-11 PROCEDURE — 99214 OFFICE O/P EST MOD 30 MIN: CPT | Performed by: INTERNAL MEDICINE

## 2022-10-11 PROCEDURE — G8427 DOCREV CUR MEDS BY ELIG CLIN: HCPCS | Performed by: INTERNAL MEDICINE

## 2022-10-11 PROCEDURE — G8484 FLU IMMUNIZE NO ADMIN: HCPCS | Performed by: INTERNAL MEDICINE

## 2022-10-11 RX ORDER — ATORVASTATIN CALCIUM 40 MG/1
TABLET, FILM COATED ORAL
Qty: 90 TABLET | Refills: 3 | Status: SHIPPED | OUTPATIENT
Start: 2022-10-11

## 2022-10-11 RX ORDER — ASPIRIN 81 MG/1
81 TABLET, CHEWABLE ORAL DAILY
Qty: 90 TABLET | Refills: 3 | Status: SHIPPED | OUTPATIENT
Start: 2022-10-11

## 2022-10-11 RX ORDER — LISINOPRIL 5 MG/1
TABLET ORAL
Qty: 90 TABLET | Refills: 3 | Status: SHIPPED | OUTPATIENT
Start: 2022-10-11

## 2022-10-11 RX ORDER — METOPROLOL SUCCINATE 25 MG/1
TABLET, EXTENDED RELEASE ORAL
Qty: 90 TABLET | Refills: 3 | Status: SHIPPED | OUTPATIENT
Start: 2022-10-11

## 2022-10-11 NOTE — PROGRESS NOTES
[facility]      Cardiology Progress Note    Rigo Chandler  [birth year] October 11, 2022    CC: \"I am staying active with no heart symptoms. \"     HPI:  The patient is 65 y.o. male with a past medical history significant for CADs/p  LAD stent (5/14) HTN and HLD.     8/2021 we saw him back in follow up after almost 2 years. He denied any recurrent or new cardiac sounding complaints. He did admit to off/on BLE edema. Patient denied exertional chest pain/pressure, dyspnea at rest, CLAY, PND, orthopnea, palpitations, lightheadedness, weight changes, changes in LE edema, and syncope. He had not completed routine labs. He did admit to medical therapy compliance. He reported that he does stay active. 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 Today, we are seeing him back in follow up after 14 months. No repeat labs. He currently denies any new or recurrent cardiac sounding complaints. He has lost 15# over the last year. He reports that he is staying active regularly. Patient denies exertional chest pain/pressure, dyspnea at rest, CLAY, PND, orthopnea, palpitations, lightheadedness, weight changes, changes in LE edema, and syncope. Also admits to medical therapy compliance and tolerating.        Past Medical History:   Diagnosis Date    CAD S/P percutaneous coronary angioplasty 5/29/14    BA in LAD    HTN (hypertension)     Hyperlipidemia      Past Surgical History:   Procedure Laterality Date    CORONARY ANGIOPLASTY WITH STENT PLACEMENT  5/29/14    BA to LAD     Family History   Problem Relation Age of Onset    Heart Disease Mother     High Blood Pressure Mother     Diabetes Father     High Blood Pressure Father      Social History     Tobacco Use    Smoking status: Never    Smokeless tobacco: Never   Vaping Use    Vaping Use: Never used   Substance Use Topics    Alcohol use: No    Drug use: No       No Known Allergies  Current Outpatient Medications   Medication Sig Dispense Refill    atorvastatin (LIPITOR) 40 MG tablet TAKE 1 TABLET BY MOUTH EVERY DAY 30 tablet 0    metoprolol succinate (TOPROL XL) 25 MG extended release tablet TAKE 1 TABLET BY MOUTH EVERY DAY 30 tablet 0    lisinopril (PRINIVIL;ZESTRIL) 5 MG tablet TAKE ONE TABLET BY MOUTH DAILY 90 tablet 2    aspirin 81 MG chewable tablet Take 1 tablet by mouth daily. 30 tablet 3     No current facility-administered medications for this visit. Review of Systems:Review of Systems:  Constitutional: no unanticipated weight loss. There's been no change in energy level, sleep pattern, or activity level. No fevers, chills. Eyes: No visual changes or diplopia. No scleral icterus. ENT: No Headaches, hearing loss or vertigo. No mouth sores or sore throat. Cardiovascular: as reviewed in HPI  Respiratory: No cough or wheezing, no sputum production. No hematemesis. Gastrointestinal: No abdominal pain, appetite loss, blood in stools. No change in bowel or bladder habits. Genitourinary: No dysuria, trouble voiding, or hematuria. Musculoskeletal:  No gait disturbance, no joint complaints. Integumentary: No rash or pruritis. Neurological: No headache, diplopia, change in muscle strength, numbness or tingling. Psychiatric: No anxiety or depression. Endocrine: No temperature intolerance. No excessive thirst, fluid intake, or urination. No tremor. Hematologic/Lymphatic: No abnormal bruising or bleeding, blood clots or swollen lymph nodes. Allergic/Immunologic: No nasal congestion or hives. Physical Exam:   /74   Pulse 82   Ht 6' (1.829 m)   Wt 240 lb (108.9 kg)   SpO2 98%   BMI 32.55 kg/m²   Wt Readings from Last 3 Encounters:   10/11/22 240 lb (108.9 kg)   08/11/21 255 lb (115.7 kg)   10/02/19 245 lb (111.1 kg)     Constitutional: He is oriented to person, place, and time. He appears well-developed and well-nourished. In no acute distress. Head: Normocephalic and atraumatic. Pupils equal and round.   Neck: Neck supple. No JVP or carotid bruit appreciated. No mass and no thyromegaly present. No lymphadenopathy present. Cardiovascular: Normal rate. Normal heart sounds. Exam reveals no gallop and no friction rub. No murmur heard. Pulmonary/Chest: Effort normal and breath sounds normal. No respiratory distress. He has no wheezes, rhonchi or rales. Abdominal: Soft, non-tender. Bowel sounds are normal. He exhibits no organomegaly, mass or bruit. Extremities: 2+ RLE, 1+ LLE  edema, no cyanosis, or clubbing. Pulses are 2+ radial/dorsalis pedis/posterior tibial/carotid bilaterally. Neurological: No gross cranial nerve deficit. Coordination normal.   Skin: Skin is warm and dry. There is no rash or diaphoresis. Psychiatric: He has a normal mood and affect. His speech is normal and behavior is normal.     Lab Review:   Lab Results   Component Value Date/Time    TRIG 187 05/29/2014 03:49 PM    HDL 44 05/29/2014 03:49 PM    LDLCALC 141 05/29/2014 03:49 PM    LABVLDL 37 05/29/2014 03:49 PM       Lab Results   Component Value Date/Time    BUN 22 12/28/2014 05:02 PM    CREATININE 0.9 12/28/2014 05:02 PM     EKG Interpretation:   11/2017, sinus rhythm   8/11/21: Sinus  Rhythm -Poor R-wave progression -may be secondary to pulmonary disease   consider old anterior infarct. Low voltage -possible pulmonary disease. ~65 bpm.   10/11/22:  SR    Image Review:     Cardiac cath:5/29/14  LEFT VENTRICULOGRAM: Reveals mild anterolateral hypokinesis but overall the ejection fraction is still around 50%. No mitral regurgitation is seen. OVERALL IMPRESSION:  1. Proximal 20% to 30% right coronary artery stenosis. 2. Patent left main trunk. 3. Stenosis 99.9% of the proximal left anterior descending artery with GIACOMO grade 1 flow. 4. Patent left circumflex artery.   5. Successful angioplasty followed by Drug-Eluting stent deployment in the proximal left anterior descending artery 99.9% stenosis reduced to 0%; final diameter is 3.50 in the proximal and 3.28 in the distal segment with GIACOMO grade 3 flow. 6. Bring the patient to ICU, get serial enzymes and get an echocardiogram.  7. Will continue with anti-platelet beta blocker and statin therapy. ECHO:5/29/14  Left ventricle size is normal.  Normal left ventricular wall thickness. Ejection fraction is visually estimated to be approximately 45%. There is severe hypokinesis of the apex. There is reversal of E/A inflow velocities across the mitral valve suggesting impaired left ventricular relaxation. Normal right ventricular size and function. There are no significant valvular abnormalities appreciated in this study. Assessment/Plan:     Ischemic cardiomyopathy/acute on chronic systolic heart failure/diastolic dysfunction  -RLE edema 2+, LLE 1+ per physical exam otherwise normal.   -He denies any SOB, CLAY, PND, orthopnea. -He continues to farm .    -His clinical examination is remarkable for at least + lower extremity edema. -EKG today sinus  Rhythm. I would like to complete an echocardiogram to assess for progressive structural heart disease-he has declined repeatedly. -he has not required any Lasix, declined therapy in the past  -encouraged elevating legs, compression stocking, low salt diet and remain active. Coronary artery disease  -Patient denies any symptoms of chest pain and has remained stable. -Pt is on BBlocker, Statin and Antiplatelet therapy    Hypertension, essential   -Blood pressure is stable today. Hyperlipidemia, unspecified   No recent lipid / liver panel on file. He is on high potency statin therapy. Will complete CMP, BNP and lipid-fasting. He declines but stressed importance or routine labs with current medical therapy. Declines flu and COVID -19 vaccinations. We will plan yearly follow up. Thank you very much for allowing me to participate in the care of your patient.  Please do not hesitate to contact me if you have any questions. Sincerely,  Buddy Bailon MD      [address] [place] [address]  Ph: [phone]  Fax: [phone]      This note was scribed in the presence of Dr. Lui Lujan MD by Mac Perez RN.

## 2023-10-03 ENCOUNTER — OFFICE VISIT (OUTPATIENT)
Dept: CARDIOLOGY CLINIC | Age: 78
End: 2023-10-03
Payer: MEDICARE

## 2023-10-03 VITALS
SYSTOLIC BLOOD PRESSURE: 124 MMHG | BODY MASS INDEX: 32.23 KG/M2 | WEIGHT: 238 LBS | OXYGEN SATURATION: 99 % | DIASTOLIC BLOOD PRESSURE: 80 MMHG | HEIGHT: 72 IN | HEART RATE: 64 BPM

## 2023-10-03 DIAGNOSIS — E78.5 HYPERLIPIDEMIA, UNSPECIFIED HYPERLIPIDEMIA TYPE: ICD-10-CM

## 2023-10-03 DIAGNOSIS — I25.10 CORONARY ARTERY DISEASE INVOLVING NATIVE CORONARY ARTERY OF NATIVE HEART WITHOUT ANGINA PECTORIS: ICD-10-CM

## 2023-10-03 DIAGNOSIS — I51.89 DIASTOLIC DYSFUNCTION: ICD-10-CM

## 2023-10-03 DIAGNOSIS — I50.22 CHRONIC SYSTOLIC HEART FAILURE (HCC): ICD-10-CM

## 2023-10-03 DIAGNOSIS — I10 ESSENTIAL HYPERTENSION: ICD-10-CM

## 2023-10-03 DIAGNOSIS — Z79.02 LONG TERM (CURRENT) USE OF ANTITHROMBOTICS/ANTIPLATELETS: ICD-10-CM

## 2023-10-03 DIAGNOSIS — I25.5 ISCHEMIC CARDIOMYOPATHY: Primary | ICD-10-CM

## 2023-10-03 PROCEDURE — 99214 OFFICE O/P EST MOD 30 MIN: CPT | Performed by: INTERNAL MEDICINE

## 2023-10-03 PROCEDURE — 93000 ELECTROCARDIOGRAM COMPLETE: CPT | Performed by: INTERNAL MEDICINE

## 2023-10-03 PROCEDURE — G8417 CALC BMI ABV UP PARAM F/U: HCPCS | Performed by: INTERNAL MEDICINE

## 2023-10-03 PROCEDURE — 3074F SYST BP LT 130 MM HG: CPT | Performed by: INTERNAL MEDICINE

## 2023-10-03 PROCEDURE — G8428 CUR MEDS NOT DOCUMENT: HCPCS | Performed by: INTERNAL MEDICINE

## 2023-10-03 PROCEDURE — 3079F DIAST BP 80-89 MM HG: CPT | Performed by: INTERNAL MEDICINE

## 2023-10-03 PROCEDURE — 1123F ACP DISCUSS/DSCN MKR DOCD: CPT | Performed by: INTERNAL MEDICINE

## 2023-10-03 PROCEDURE — 1036F TOBACCO NON-USER: CPT | Performed by: INTERNAL MEDICINE

## 2023-10-03 PROCEDURE — G8484 FLU IMMUNIZE NO ADMIN: HCPCS | Performed by: INTERNAL MEDICINE

## 2023-10-03 NOTE — PROGRESS NOTES
[facility]      Cardiology Progress Note    Yasmeen Yancey  [birth year]    October 3, 2023    CC: \"I feel just fine. \"     HPI:  The patient is 77 y.o. male with a past medical history significant for CADs/p  LAD stent (5/14) HTN and HLD.     10/11/22: we are seeing him back in follow up after 14 months. No repeat labs. He currently denies any new or recurrent cardiac sounding complaints. He has lost 15# over the last year. He reports that he is staying active regularly. Today, presents for yearly follow up. Overall he reports he is doing well and feeling well from a cardiac standpoint with no specific cardiac complaints. Patient denies exertional chest pain/pressure, dyspnea at rest, CLAY, PND, orthopnea, palpitations, lightheadedness, weight changes, changes in LE edema, and syncope. The patient admits to medical therapy compliance and feels he is tolerating. He reports that he has been staying active regularly.      Past Medical History:   Diagnosis Date    CAD S/P percutaneous coronary angioplasty 5/29/14    BA in LAD    HTN (hypertension)     Hyperlipidemia      Past Surgical History:   Procedure Laterality Date    CORONARY ANGIOPLASTY WITH STENT PLACEMENT  5/29/14    BA to LAD     Family History   Problem Relation Age of Onset    Heart Disease Mother     High Blood Pressure Mother     Diabetes Father     High Blood Pressure Father      Social History     Tobacco Use    Smoking status: Never    Smokeless tobacco: Never   Vaping Use    Vaping Use: Never used   Substance Use Topics    Alcohol use: No    Drug use: No       No Known Allergies  Current Outpatient Medications   Medication Sig Dispense Refill    atorvastatin (LIPITOR) 40 MG tablet 40 mg daily by mouth 90 tablet 3    aspirin 81 MG chewable tablet Take 1 tablet by mouth daily 90 tablet 3    metoprolol succinate (TOPROL XL) 25 MG extended release tablet Take 25 mg daily 90 tablet 3    lisinopril (PRINIVIL;ZESTRIL) 5 MG tablet TAKE ONE

## 2023-11-30 RX ORDER — LISINOPRIL 5 MG/1
TABLET ORAL
Qty: 90 TABLET | Refills: 3 | Status: SHIPPED | OUTPATIENT
Start: 2023-11-30

## 2023-11-30 NOTE — TELEPHONE ENCOUNTER
Requested Prescriptions     Pending Prescriptions Disp Refills    lisinopril (PRINIVIL;ZESTRIL) 5 MG tablet [Pharmacy Med Name: LISINOPRIL 5 MG TABLET] 90 tablet 3     Sig: TAKE ONE TABLET BY MOUTH DAILY          Number: 90    Refills: 3    Last Office Visit: 10/3/2023     Next Office Visit: None scheduled     Last Refill: 10/11/2022    Last Labs: 12/28/2014

## 2023-12-12 RX ORDER — ATORVASTATIN CALCIUM 40 MG/1
TABLET, FILM COATED ORAL
Qty: 90 TABLET | Refills: 3 | Status: SHIPPED | OUTPATIENT
Start: 2023-12-12

## 2023-12-12 RX ORDER — METOPROLOL SUCCINATE 25 MG/1
TABLET, EXTENDED RELEASE ORAL
Qty: 90 TABLET | Refills: 3 | Status: SHIPPED | OUTPATIENT
Start: 2023-12-12

## 2023-12-12 NOTE — TELEPHONE ENCOUNTER
Last OV: 10/3/23  Next OV: none scheduled 1 yr follow up   Last refill: 10/11/22  Most recent Labs:   10/3/23   Last EKG (if needed):  10/3/23

## 2024-12-26 RX ORDER — LISINOPRIL 5 MG/1
5 TABLET ORAL DAILY
Qty: 90 TABLET | Refills: 0 | Status: SHIPPED | OUTPATIENT
Start: 2024-12-26

## 2024-12-26 NOTE — TELEPHONE ENCOUNTER
Last OV:10/03/2023  Last Labs:10/03/2023  Last Refills:11/30/2023  Next Appt: X  Last EKG: 10/03/2023

## 2025-03-12 DIAGNOSIS — I50.22 CHRONIC SYSTOLIC HEART FAILURE (HCC): ICD-10-CM

## 2025-03-12 DIAGNOSIS — I10 ESSENTIAL HYPERTENSION: ICD-10-CM

## 2025-03-12 DIAGNOSIS — E78.5 HYPERLIPIDEMIA, UNSPECIFIED HYPERLIPIDEMIA TYPE: ICD-10-CM

## 2025-03-12 DIAGNOSIS — I25.10 CORONARY ARTERY DISEASE INVOLVING NATIVE CORONARY ARTERY OF NATIVE HEART WITHOUT ANGINA PECTORIS: ICD-10-CM

## 2025-03-12 DIAGNOSIS — I25.5 ISCHEMIC CARDIOMYOPATHY: Primary | ICD-10-CM

## 2025-03-13 RX ORDER — METOPROLOL SUCCINATE 25 MG/1
25 TABLET, EXTENDED RELEASE ORAL DAILY
Qty: 30 TABLET | Refills: 0 | Status: SHIPPED | OUTPATIENT
Start: 2025-03-13

## 2025-03-13 RX ORDER — ATORVASTATIN CALCIUM 40 MG/1
40 TABLET, FILM COATED ORAL DAILY
Qty: 30 TABLET | Refills: 0 | Status: SHIPPED | OUTPATIENT
Start: 2025-03-13

## 2025-03-13 NOTE — TELEPHONE ENCOUNTER
Last OV: 10/3/23  Next OV: X due yearly  Last refill: 12/12/23  Most recent Labs:   Last EKG (if needed):    Due for appt and labs

## 2025-03-13 NOTE — TELEPHONE ENCOUNTER
Called patient left message to return call and set up yearly appt with Dr Farah.   He needs to get labs done. Can go to any [facility] facility to get done. Make sure he is fasting.   Lab orders placed.

## 2025-03-26 RX ORDER — LISINOPRIL 5 MG/1
5 TABLET ORAL DAILY
Qty: 30 TABLET | Refills: 0 | Status: SHIPPED | OUTPATIENT
Start: 2025-03-26

## 2025-03-26 NOTE — TELEPHONE ENCOUNTER
Last OV: 10/3/23   Next OV: LVM to call and schedule appt  Last Labs: 3/13/25  Last EKG: 10/3/23   Last Filled: 12/26/24 #90 1 tab qd 0rf    Front office: LVM to call and schedule appt. Pt overdue.  Please try to reach out and schedule. Changed rx to 30 day with note to schedule appt.  Please fill if appropriate

## 2025-04-15 NOTE — TELEPHONE ENCOUNTER
Medication Refill    When was your last appointment with cardiology?    (If 1 yr or longer, please schedule appointment)    (If patient has been told they do not need to follow-up - medications should be filled by PCP)  10/3/23  When did you last have labs drawn?   12/28/2014  Medication needing refilled?  lisinopril (PRINIVIL;ZESTRIL)   atorvastatin (LIPITOR)   metoprolol succinate (TOPROL XL)   Dosage of the medication?  5 MG tablet   40 MG tablet   25 MG extended release tablet   How are you taking this medication (QD, BID, TID, QID, PRN)?  TAKE 1 TABLET BY MOUTH DAILY   TAKE 1 TABLET BY MOUTH EVERY DAY   TAKE 1 TABLET BY MOUTH EVERY DAY   Do you want a 30 or 90 day supply?  90 day  When will you run out of your medication?     Which Pharmacy are we sending this medication to?  [id number] [address]   Pharmacy Phone:[phone]  Pharmacy Fax:[phone]    Geovani is aware that labs are needed as well.

## 2025-04-15 NOTE — TELEPHONE ENCOUNTER
Last OV: 10/3/23  Next OV: 5/14/25  Last refill:3/26/25, 3/13/25  Most recent Labs: CMP 12/28/14, Lipid panel 5/29/14  Last EKG (if needed):  Called patient and requested that he keep his follow up appointment and make sure that he has fasting lab work.  Patient verbalized and confirmed understanding.

## 2025-04-16 RX ORDER — LISINOPRIL 5 MG/1
5 TABLET ORAL DAILY
Qty: 30 TABLET | Refills: 0 | Status: SHIPPED | OUTPATIENT
Start: 2025-04-16

## 2025-04-16 RX ORDER — METOPROLOL SUCCINATE 25 MG/1
25 TABLET, EXTENDED RELEASE ORAL DAILY
Qty: 30 TABLET | Refills: 0 | Status: SHIPPED | OUTPATIENT
Start: 2025-04-16

## 2025-04-16 RX ORDER — ATORVASTATIN CALCIUM 40 MG/1
40 TABLET, FILM COATED ORAL DAILY
Qty: 30 TABLET | Refills: 0 | Status: SHIPPED | OUTPATIENT
Start: 2025-04-16

## 2025-04-18 DIAGNOSIS — I50.22 CHRONIC SYSTOLIC HEART FAILURE (HCC): ICD-10-CM

## 2025-04-18 DIAGNOSIS — I25.10 CORONARY ARTERY DISEASE INVOLVING NATIVE CORONARY ARTERY OF NATIVE HEART WITHOUT ANGINA PECTORIS: ICD-10-CM

## 2025-04-18 DIAGNOSIS — E78.5 HYPERLIPIDEMIA, UNSPECIFIED HYPERLIPIDEMIA TYPE: ICD-10-CM

## 2025-04-18 DIAGNOSIS — I25.5 ISCHEMIC CARDIOMYOPATHY: ICD-10-CM

## 2025-04-18 DIAGNOSIS — I10 ESSENTIAL HYPERTENSION: ICD-10-CM

## 2025-04-18 LAB
ALBUMIN SERPL-MCNC: 4.2 G/DL (ref 3.4–5)
ALBUMIN/GLOB SERPL: 1.5 {RATIO} (ref 1.1–2.2)
ALP SERPL-CCNC: 93 U/L (ref 40–129)
ALT SERPL-CCNC: 19 U/L (ref 10–40)
ANION GAP SERPL CALCULATED.3IONS-SCNC: 10 MMOL/L (ref 3–16)
AST SERPL-CCNC: 27 U/L (ref 15–37)
BILIRUB SERPL-MCNC: 0.5 MG/DL (ref 0–1)
BUN SERPL-MCNC: 26 MG/DL (ref 7–20)
CALCIUM SERPL-MCNC: 9.9 MG/DL (ref 8.3–10.6)
CHLORIDE SERPL-SCNC: 103 MMOL/L (ref 99–110)
CHOLEST SERPL-MCNC: 219 MG/DL (ref 0–199)
CO2 SERPL-SCNC: 29 MMOL/L (ref 21–32)
CREAT SERPL-MCNC: 1 MG/DL (ref 0.8–1.3)
DEPRECATED RDW RBC AUTO: 13.5 % (ref 12.4–15.4)
GFR SERPLBLD CREATININE-BSD FMLA CKD-EPI: 76 ML/MIN/{1.73_M2}
GLUCOSE SERPL-MCNC: 117 MG/DL (ref 70–99)
HCT VFR BLD AUTO: 42.2 % (ref 40.5–52.5)
HDLC SERPL-MCNC: 38 MG/DL (ref 40–60)
HGB BLD-MCNC: 14.4 G/DL (ref 13.5–17.5)
LDLC SERPL CALC-MCNC: 145 MG/DL
LDLC SERPL-MCNC: 140 MG/DL
MCH RBC QN AUTO: 31.5 PG (ref 26–34)
MCHC RBC AUTO-ENTMCNC: 34.2 G/DL (ref 31–36)
MCV RBC AUTO: 92.1 FL (ref 80–100)
PLATELET # BLD AUTO: 237 K/UL (ref 135–450)
PMV BLD AUTO: 8.3 FL (ref 5–10.5)
POTASSIUM SERPL-SCNC: 5.2 MMOL/L (ref 3.5–5.1)
PROT SERPL-MCNC: 7 G/DL (ref 6.4–8.2)
RBC # BLD AUTO: 4.59 M/UL (ref 4.2–5.9)
SODIUM SERPL-SCNC: 142 MMOL/L (ref 136–145)
TRIGL SERPL-MCNC: 181 MG/DL (ref 0–150)
VLDLC SERPL CALC-MCNC: 36 MG/DL
WBC # BLD AUTO: 6.8 K/UL (ref 4–11)

## 2025-04-22 ENCOUNTER — RESULTS FOLLOW-UP (OUTPATIENT)
Dept: CARDIOLOGY CLINIC | Age: 80
End: 2025-04-22

## 2025-04-24 ENCOUNTER — TELEPHONE (OUTPATIENT)
Dept: CARDIOLOGY CLINIC | Age: 80
End: 2025-04-24

## 2025-04-24 NOTE — TELEPHONE ENCOUNTER
Last OV: 10/3/23  Next OV: 5/14/25  Last refill: 4/16 /25  Most recent Labs:   Last EKG (if needed):    Rx was sent to [facility] [phone] on 4/16/25.  Called pharmacy and spoke to Fermin Lauren who said Rx is ready for patient to  there.  I made Leonidas aware that he can request 90 day supply in office on 5/14/25 since he has not been seen since 10/3/23.  Patient can make sure that there is no dose change when he is here.

## 2025-04-24 NOTE — TELEPHONE ENCOUNTER
Elda (son) returning call - relayed msg. Kendal states [facility] already picked up metoprolol and lisinopril yesterday. They will  atorvastatin as well.

## 2025-04-28 RX ORDER — LISINOPRIL 5 MG/1
5 TABLET ORAL DAILY
Qty: 30 TABLET | Refills: 0 | Status: SHIPPED | OUTPATIENT
Start: 2025-04-28 | End: 2025-04-29 | Stop reason: SDUPTHER

## 2025-04-28 NOTE — TELEPHONE ENCOUNTER
Last OV: 10/3/23  Next OV: 5/14/25  Last refill: 4/16/25 #30  Most recent Labs: 4/18/25  Last EKG (if needed):

## 2025-04-29 RX ORDER — LISINOPRIL 5 MG/1
5 TABLET ORAL DAILY
Qty: 30 TABLET | Refills: 3 | Status: SHIPPED | OUTPATIENT
Start: 2025-04-29

## 2025-05-14 ENCOUNTER — OFFICE VISIT (OUTPATIENT)
Dept: CARDIOLOGY CLINIC | Age: 80
End: 2025-05-14
Payer: MEDICARE

## 2025-05-14 VITALS
WEIGHT: 236.8 LBS | BODY MASS INDEX: 32.07 KG/M2 | SYSTOLIC BLOOD PRESSURE: 120 MMHG | OXYGEN SATURATION: 96 % | HEIGHT: 72 IN | HEART RATE: 46 BPM | DIASTOLIC BLOOD PRESSURE: 80 MMHG

## 2025-05-14 DIAGNOSIS — I25.10 CORONARY ARTERY DISEASE INVOLVING NATIVE CORONARY ARTERY OF NATIVE HEART WITHOUT ANGINA PECTORIS: ICD-10-CM

## 2025-05-14 DIAGNOSIS — I25.5 ISCHEMIC CARDIOMYOPATHY: Primary | ICD-10-CM

## 2025-05-14 DIAGNOSIS — I51.89 DIASTOLIC DYSFUNCTION: ICD-10-CM

## 2025-05-14 DIAGNOSIS — I50.22 CHRONIC SYSTOLIC HEART FAILURE (HCC): ICD-10-CM

## 2025-05-14 DIAGNOSIS — E78.5 HYPERLIPIDEMIA, UNSPECIFIED HYPERLIPIDEMIA TYPE: ICD-10-CM

## 2025-05-14 PROCEDURE — G8417 CALC BMI ABV UP PARAM F/U: HCPCS | Performed by: INTERNAL MEDICINE

## 2025-05-14 PROCEDURE — G8427 DOCREV CUR MEDS BY ELIG CLIN: HCPCS | Performed by: INTERNAL MEDICINE

## 2025-05-14 PROCEDURE — 99214 OFFICE O/P EST MOD 30 MIN: CPT | Performed by: INTERNAL MEDICINE

## 2025-05-14 PROCEDURE — 1123F ACP DISCUSS/DSCN MKR DOCD: CPT | Performed by: INTERNAL MEDICINE

## 2025-05-14 PROCEDURE — 1159F MED LIST DOCD IN RCRD: CPT | Performed by: INTERNAL MEDICINE

## 2025-05-14 PROCEDURE — 3079F DIAST BP 80-89 MM HG: CPT | Performed by: INTERNAL MEDICINE

## 2025-05-14 PROCEDURE — 3074F SYST BP LT 130 MM HG: CPT | Performed by: INTERNAL MEDICINE

## 2025-05-14 PROCEDURE — 1036F TOBACCO NON-USER: CPT | Performed by: INTERNAL MEDICINE

## 2025-05-14 RX ORDER — ATORVASTATIN CALCIUM 80 MG/1
80 TABLET, FILM COATED ORAL DAILY
Qty: 90 TABLET | Refills: 3 | Status: SHIPPED | OUTPATIENT
Start: 2025-05-14

## 2025-05-14 RX ORDER — EZETIMIBE 10 MG/1
10 TABLET ORAL DAILY
Qty: 90 TABLET | Refills: 3 | Status: SHIPPED | OUTPATIENT
Start: 2025-05-14

## 2025-05-14 NOTE — PATIENT INSTRUCTIONS
1) Increase atorvastatin from 40 mg daily to 80 mg daily    2) Start Zetia 10 mg daily     3) Repeat fasting labs between 3-4 months-orders and instructions given.     4) Work on a heart healthy diet ,  low sodium diet and monitor daily fluid intake to keep ~1500 ml/day     5) We will call with lab results     6) Call the office with any questions /concerns.

## 2025-05-14 NOTE — PROGRESS NOTES
[facility]      Cardiology Progress Note    Geovani Pemberton  1945    May 14, 2025    CC: \"I am doing well with no symptoms.\"     HPI:  The patient is 79 y.o. male with a past medical history significant for CADs/p  LAD stent (5/14) HTN and HLD.     10/11/22: we are seeing him back in follow up after 14 months. No repeat labs. He currently denies any new or recurrent cardiac sounding complaints. He has lost 15# over the last year. He reports that he is staying active regularly.     10/3/23 presents for yearly follow up. Overall he reports he is doing well and feeling well from a cardiac standpoint with no specific cardiac complaints. He reports that he has been staying active regularly.     Today, returns after 1.5 years, however, he was due to return yearly. Labs completed 4/18/25-unable to get a hold of patient x4 with letter sent. (LDL cholesterol is still significantly elevated at 140.  If patient is indeed taking Lipitor 40 mg daily, have him increase it to 80 mg a day and add Zetia 10 mg daily.  Repeat liver and lipid profile in 3 months.)  Today, he states he did not receive the letter we sent to the pharmacy. Overall he reports he is doing well and feeling well from a cardiac standpoint with no specific cardiac complaints.  The patient admits to medical therapy compliance and feels he is tolerating. Patient denies exertional chest pain/pressure, dyspnea at rest, CLAY, PND, orthopnea, palpitations, heart racing, lightheadedness, weight changes, changes in LE edema, and syncope.          Past Medical History:   Diagnosis Date    CAD S/P percutaneous coronary angioplasty 5/29/14    BA in LAD    HTN (hypertension)     Hyperlipidemia      Past Surgical History:   Procedure Laterality Date    CORONARY ANGIOPLASTY WITH STENT PLACEMENT  5/29/14    BA to LAD     Family History   Problem Relation Age of Onset    Heart Disease Mother     High Blood Pressure Mother     Diabetes Father     High

## 2025-05-19 RX ORDER — METOPROLOL SUCCINATE 25 MG/1
25 TABLET, EXTENDED RELEASE ORAL DAILY
Qty: 90 TABLET | Refills: 0 | Status: SHIPPED | OUTPATIENT
Start: 2025-05-19

## 2025-05-19 RX ORDER — ATORVASTATIN CALCIUM 40 MG/1
40 TABLET, FILM COATED ORAL DAILY
Qty: 90 TABLET | Refills: 0 | Status: SHIPPED | OUTPATIENT
Start: 2025-05-19

## 2025-05-19 NOTE — TELEPHONE ENCOUNTER
Last OV: 5/14/25 - Farah  Next OV: 5/2026 - Recall   Last Labs: 5/14/25  Last EKG: 10/3/23   Last Filled:   4/16/25 - Metoprolol - #30   1tab qd     3rf  Only Metoprolol needs filled      Atorvastatin was filled 5/14/25   #90 with 3rf. Receipt confirmed.

## 2025-06-03 NOTE — TELEPHONE ENCOUNTER
Last OV: 5/14/25  Next OV: X due yearly  Last refill: 5/19/25 #90  Most recent Labs:4/18/25   Last EKG (if needed):

## 2025-06-04 RX ORDER — METOPROLOL SUCCINATE 25 MG/1
25 TABLET, EXTENDED RELEASE ORAL DAILY
Qty: 90 TABLET | Refills: 3 | Status: SHIPPED | OUTPATIENT
Start: 2025-06-04
